# Patient Record
Sex: MALE | Race: AMERICAN INDIAN OR ALASKA NATIVE
[De-identification: names, ages, dates, MRNs, and addresses within clinical notes are randomized per-mention and may not be internally consistent; named-entity substitution may affect disease eponyms.]

---

## 2017-07-11 ENCOUNTER — HOSPITAL ENCOUNTER (EMERGENCY)
Dept: HOSPITAL 5 - ED | Age: 37
Discharge: HOME | End: 2017-07-11
Payer: SELF-PAY

## 2017-07-11 VITALS — SYSTOLIC BLOOD PRESSURE: 138 MMHG | DIASTOLIC BLOOD PRESSURE: 97 MMHG
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
## 2017-07-11 DIAGNOSIS — F17.200: ICD-10-CM
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
## 2017-07-11 DIAGNOSIS — M54.5: Primary | ICD-10-CM
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
## 2017-07-11 PROCEDURE — 96372 THER/PROPH/DIAG INJ SC/IM: CPT

## 2017-07-11 PROCEDURE — 99283 EMERGENCY DEPT VISIT LOW MDM: CPT

## 2017-07-11 PROCEDURE — 72100 X-RAY EXAM L-S SPINE 2/3 VWS: CPT

## 2017-07-11 NOTE — XRAY REPORT
Lumbar spine 3 views:



History: Of the brain status post fall.



Findings:



Normal height of vertebral bodies and intervertebral disc. Normal 

articular surfaces. No fracture. No paravertebral mass.



Impression:



No bony or articular abnormality lumbar spine.

## 2017-07-11 NOTE — EMERGENCY DEPARTMENT REPORT
ED Back Pain/Injury HPI





- General


Chief Complaint: Fall


Stated Complaint: BACK INJURY


Time Seen by Provider: 17 08:34


Source: patient


Mode of arrival: Ambulatory


Limitations: No Limitations





- History of Present Illness


Initial Comments: 





PT states that on  he was moving items and he slipped and fell.  PT 

states his r low back hurt at the time but on Monday morning, his pain 

increased.  PT states he has been taking Tylenol ( three at a time) and not 

having any relief.  Pt denies any other injury.  PT denies loc or incontinence.

  PT rates his pain 9/10.  


MD Complaint: back injury


-: Sudden


Similar Symptoms Previously: No


Place: home


Radiation: none


Severity: severe


Severity scale (0 -10): 9


Quality: sharp


Consistency: constant


Improves With: none


Worsens With: movement, walking


Context: fall


Associated Symptoms: denies other symptoms.  denies: chest pain, difficulty 

urinating, incontinence, fever/chills, headaches, abdominal pain, nausea/

vomiting, shortness of breath, syncope





- Related Data


 Previous Rx's











 Medication  Instructions  Recorded  Last Taken  Type


 


Acetaminophen/Codeine [Tylenol #3] 1 tab PO Q6H PRN #8 tab 17 Unknown Rx


 


Ibuprofen [Motrin] 600 mg PO Q8H PRN #15 tablet 17 Unknown Rx


 


methOCARBAMOL [Robaxin TAB] 500 mg PO Q6H PRN #15 tablet 17 Unknown Rx











 Allergies











Allergy/AdvReac Type Severity Reaction Status Date / Time


 


No Known Allergies Allergy   Unverified 12/30/15 14:32














ED Review of Systems


ROS: 


Stated complaint: BACK INJURY


Other details as noted in HPI





Comment: All other systems reviewed and negative


Constitutional: other (pt states he could not sleep last night due to the pain )

.  denies: fever


Respiratory: denies: shortness of breath, SOB with exertion, SOB at rest


Cardiovascular: denies: chest pain, syncope


Gastrointestinal: denies: abdominal pain, nausea, vomiting


Musculoskeletal: back pain, other (back feels swollen )


Neurological: denies: headache, weakness





ED Past Medical Hx





- Past Medical History


Previous Medical History?: No





- Surgical History


Past Surgical History?: No





- Social History


Smoking Status: Current Every Day Smoker


Substance Use Type: None





- Medications


Home Medications: 


 Home Medications











 Medication  Instructions  Recorded  Confirmed  Last Taken  Type


 


Acetaminophen/Codeine [Tylenol #3] 1 tab PO Q6H PRN #8 tab 17  Unknown Rx


 


Ibuprofen [Motrin] 600 mg PO Q8H PRN #15 tablet 17  Unknown Rx


 


methOCARBAMOL [Robaxin TAB] 500 mg PO Q6H PRN #15 tablet 17  Unknown Rx














ED Physical Exam





- General


Limitations: No Limitations


General appearance: alert, in no apparent distress





- Head


Head exam: Present: atraumatic, normocephalic, normal inspection





- Eye


Eye exam: Present: normal appearance, PERRL, EOMI.  Absent: conjunctival 

injection





- ENT


ENT exam: Present: normal exam, mucous membranes moist, normal external ear exam





- Neck


Neck exam: Present: normal inspection, full ROM, other (no post midline C-spine 

tenderness ).  Absent: tenderness





- Respiratory


Respiratory exam: Present: normal lung sounds bilaterally.  Absent: respiratory 

distress, wheezes, chest wall tenderness





- Cardiovascular


Cardiovascular Exam: Present: regular rate, normal rhythm, normal heart sounds





- GI/Abdominal


GI/Abdominal exam: Present: soft, normal bowel sounds.  Absent: tenderness





- Extremities Exam


Extremities exam: Present: normal inspection, full ROM





- Back Exam


Back exam: Present: normal inspection, tenderness, muscle spasm, paraspinal 

tenderness (R Lumbar spine ).  Absent: full ROM, CVA tenderness (R), CVA 

tenderness (L), vertebral tenderness





- Neurological Exam


Neurological exam: Present: alert, oriented X3, normal gait





- Psychiatric


Psychiatric exam: Present: normal affect, normal mood





- Skin


Skin exam: Present: warm, dry, intact





ED Course


 Vital Signs











  17





  07:20


 


Temperature 98.5 F


 


Pulse Rate 77


 


Respiratory 22





Rate 


 


Blood Pressure 138/97


 


O2 Sat by Pulse 100





Oximetry 














- Reevaluation(s)


Reevaluation #1: 





17 09:24


PT reports decrease in pain sp Toradol. 


Reevaluation #2: 





17 09:37


PT aware of XR results and plan of care.  PT has no questions at this time. 





- Pulse Oximetry Interpretation


  ** Digit-Finger


Initial Pulse Oximetry Readin


Actions Taken: none





ED Medical Decision Making





- Radiology Data


Radiology results: report reviewed





XR L- spine - NAP 





- Differential Diagnosis


strain, fracture


Critical Care Time: No


Critical care attestation.: 


If time is entered above; I have spent that time in minutes in the direct care 

of this critically ill patient, excluding procedure time.








ED Disposition


Clinical Impression: 


Fall


Qualifiers:


 Encounter type: initial encounter Qualified Code(s): W19.XXXA - Unspecified 

fall, initial encounter





Acute low back pain


Qualifiers:


 Back pain laterality: right Sciatica presence: without sciatica Qualified Code(

s): M54.5 - Low back pain





Disposition:  TO HOME OR SELFCARE


Is pt being admited?: No


Does the pt Need Aspirin: No


Condition: Stable


Instructions:  Low Back Strain (ED), Acute Low Back Pain (ED), Fall Prevention (

ED)


Additional Instructions: 


Do not take more than 3 gm of Tylenol a day. 


Do not drive or drink alcohol after taking Robaxin or Tylenol #3


Return to the ED if worsening or concerns 


Follow up with PCP in 3-5 days


Have your bp rechecked on follow up


Prescriptions: 


Acetaminophen/Codeine [Tylenol #3] 1 tab PO Q6H PRN #8 tab


 PRN Reason: Pain , Severe (7-10)


Ibuprofen [Motrin] 600 mg PO Q8H PRN #15 tablet


 PRN Reason: Pain


methOCARBAMOL [Robaxin TAB] 500 mg PO Q6H PRN #15 tablet


 PRN Reason: Muscle Spasm


Referrals: 


[facility] CARE,MD [Primary Care Provider] - 3-5 Days


ZACH RED MD [Referring] - 3-5 Days


[facility] [Outside] - 3-5 Days


Forms:  Accompanied Note, Work/School Release Form(ED)


Time of Disposition: 09:30

## 2019-04-04 ENCOUNTER — HOSPITAL ENCOUNTER (EMERGENCY)
Dept: HOSPITAL 5 - ED | Age: 39
Discharge: HOME | End: 2019-04-04
Payer: COMMERCIAL

## 2019-04-04 VITALS — SYSTOLIC BLOOD PRESSURE: 141 MMHG | DIASTOLIC BLOOD PRESSURE: 80 MMHG

## 2019-04-04 DIAGNOSIS — F17.200: ICD-10-CM

## 2019-04-04 DIAGNOSIS — H00.014: Primary | ICD-10-CM

## 2019-04-04 PROCEDURE — 99282 EMERGENCY DEPT VISIT SF MDM: CPT

## 2019-04-04 NOTE — EMERGENCY DEPARTMENT REPORT
ED General Adult HPI





- General


Chief complaint: Skin/Abscess/Foreign Body


Stated complaint: LEFT EYE PAIN


Time Seen by Provider: 19 03:55


Source: patient


Mode of arrival: Ambulatory


Limitations: No Limitations





- History of Present Illness


Initial comments: 


pt is a 37 y/o aam who presents stye left eye lid no visual changes no fever no 

chills complains of pain and itching symptoms relieved by hot compresses, 

exacerbated by rubbing. 





Onset/Timin


-: week(s)


Location: face (left eyebrow )


Severity scale (0 -10): 7


Quality: burning, other (itching )


Consistency: intermittent


Improves with: other (hot compresses )


Worsens with: none


Associated Symptoms: other (itching burning )


Treatments Prior to Arrival: none





- Related Data


                                  Previous Rx's











 Medication  Instructions  Recorded  Last Taken  Type


 


Acetaminophen/Codeine [Tylenol #3] 1 tab PO Q6H PRN #8 tab 17 Unknown Rx


 


Ibuprofen [Motrin] 600 mg PO Q8H PRN #15 tablet 17 Unknown Rx


 


methOCARBAMOL [Robaxin TAB] 500 mg PO Q6H PRN #15 tablet 17 Unknown Rx


 


Cetirizine HCl [Zyrtec] 10 mg PO DAILY #30 tablet 19 Unknown Rx


 


Ibuprofen 800 mg PO TID PRN #30 tablet 19 Unknown Rx


 


Polymyxin B Sulf/Trimethoprim 2 drop OP Q4H 10 Days #10 ml 19 Unknown Rx





[Polytrim Eye Drops]    











                                    Allergies











Allergy/AdvReac Type Severity Reaction Status Date / Time


 


No Known Allergies Allergy   Unverified 12/30/15 14:32














ED Review of Systems


ROS: 


Stated complaint: LEFT EYE PAIN


Other details as noted in HPI





Constitutional: denies: chills, fever


Eyes: other (itching).  denies: eye pain, eye discharge, vision change


ENT: denies: ear pain, throat pain


Respiratory: denies: cough, shortness of breath, wheezing


Cardiovascular: denies: chest pain, palpitations


Endocrine: no symptoms reported


Gastrointestinal: denies: abdominal pain, nausea, diarrhea


Genitourinary: denies: urgency, dysuria


Musculoskeletal: denies: back pain, joint swelling, arthralgia


Skin: denies: rash, lesions


Neurological: denies: headache, weakness, paresthesias


Psychiatric: denies: anxiety, depression


Hematological/Lymphatic: denies: easy bleeding, easy bruising





ED Past Medical Hx





- Past Medical History


Previous Medical History?: No





- Surgical History


Past Surgical History?: No





- Social History


Smoking Status: Current Every Day Smoker


Substance Use Type: None





- Medications


Home Medications: 


                                Home Medications











 Medication  Instructions  Recorded  Confirmed  Last Taken  Type


 


Acetaminophen/Codeine [Tylenol #3] 1 tab PO Q6H PRN #8 tab 17  Unknown Rx


 


Ibuprofen [Motrin] 600 mg PO Q8H PRN #15 tablet 17  Unknown Rx


 


methOCARBAMOL [Robaxin TAB] 500 mg PO Q6H PRN #15 tablet 17  Unknown Rx


 


Cetirizine HCl [Zyrtec] 10 mg PO DAILY #30 tablet 19  Unknown Rx


 


Ibuprofen 800 mg PO TID PRN #30 tablet 19  Unknown Rx


 


Polymyxin B Sulf/Trimethoprim 2 drop OP Q4H 10 Days #10 ml 19  Unknown Rx





[Polytrim Eye Drops]     














ED Physical Exam





- General


Limitations: No Limitations


General appearance: alert, in no apparent distress





- Head


Head exam: Present: atraumatic, normocephalic





- Eye


Eye exam: Present: normal appearance, PERRL, EOMI, conjunctival injection (left 

eyebrow ).  Absent: periorbital swelling, periorbital tenderness


Pupils: Present: normal accommodation





- Expanded Eye Exam


  ** Expanded


Eyelids: Normal Inspection: Right (stye left eye ), Stye: Left, Erythema: Left


Pupils: Regular, Round: Bilateral, Reactive: Bilateral


Sclera/Conjunctival: Injection: Left


Anterior chamber: Normal Inspection: Bilateral


Posterior chamber: Deferred: Bilateral


Visual acuity (R) = 20/: 20


Visual acuity (L) = 20/: 20





- ENT


ENT exam: Present: normal orophraynx, mucous membranes moist, TM's normal 

bilaterally, normal external ear exam





- Neck


Neck exam: Present: normal inspection, full ROM.  Absent: tenderness, 

meningismus, lymphadenopathy, thyromegaly





- Respiratory


Respiratory exam: Present: normal lung sounds bilaterally.  Absent: respiratory 

distress, wheezes





- Cardiovascular


Cardiovascular Exam: Present: regular rate, normal rhythm, normal heart sounds. 

Absent: systolic murmur, diastolic murmur, rubs, gallop





- GI/Abdominal


GI/Abdominal exam: Present: soft, normal bowel sounds





- Rectal


Rectal exam: Present: deferred





- Extremities Exam


Extremities exam: Present: normal inspection





- Back Exam


Back exam: Present: normal inspection.  Absent: CVA tenderness (R), CVA 

tenderness (L)





- Neurological Exam


Neurological exam: Present: alert, oriented X3, CN II-XII intact, normal gait, 

reflexes normal





- Psychiatric


Psychiatric exam: Present: normal affect, normal mood





- Skin


Skin exam: Present: warm, dry, intact, normal color.  Absent: rash





ED Course


                                   Vital Signs











  19





  03:27


 


Temperature 98.1 F


 


Pulse Rate 63


 


Respiratory 18





Rate 


 


Blood Pressure 141/80


 


O2 Sat by Pulse 100





Oximetry 














ED Medical Decision Making





- Medical Decision Making





stye left eye lid 


Critical care attestation.: 


If time is entered above; I have spent that time in minutes in the direct care 

of this critically ill patient, excluding procedure time.








ED Disposition


Clinical Impression: 


Stye external


Qualifiers:


 Laterality: left Eyelid: upper Qualified Code(s): H00.014 - Hordeolum externum 

left upper eyelid





Disposition: - TO HOME OR SELFCARE


Is pt being admited?: No


Does the pt Need Aspirin: No


Condition: Stable


Instructions:  Stye (ED)


Prescriptions: 


Ibuprofen 800 mg PO TID PRN #30 tablet


 PRN Reason: pain


Polymyxin B Sulf/Trimethoprim [Polytrim Eye Drops] 2 drop OP Q4H 10 Days #10 ml


Cetirizine HCl [Zyrtec] 10 mg PO DAILY #30 tablet


Referrals: 


DESTINY GOODRICH MD [Staff Physician] - 3-5 Days


Forms:  Work/School Release Form(ED)


Time of Disposition: 06:05